# Patient Record
Sex: FEMALE | Race: BLACK OR AFRICAN AMERICAN | NOT HISPANIC OR LATINO
[De-identification: names, ages, dates, MRNs, and addresses within clinical notes are randomized per-mention and may not be internally consistent; named-entity substitution may affect disease eponyms.]

---

## 2022-12-16 ENCOUNTER — DASHBOARD ENCOUNTERS (OUTPATIENT)
Age: 57
End: 2022-12-16

## 2022-12-16 ENCOUNTER — OFFICE VISIT (OUTPATIENT)
Dept: URBAN - METROPOLITAN AREA CLINIC 115 | Facility: CLINIC | Age: 57
End: 2022-12-16
Payer: COMMERCIAL

## 2022-12-16 ENCOUNTER — LAB OUTSIDE AN ENCOUNTER (OUTPATIENT)
Dept: URBAN - METROPOLITAN AREA CLINIC 115 | Facility: CLINIC | Age: 57
End: 2022-12-16

## 2022-12-16 ENCOUNTER — WEB ENCOUNTER (OUTPATIENT)
Dept: URBAN - METROPOLITAN AREA CLINIC 115 | Facility: CLINIC | Age: 57
End: 2022-12-16

## 2022-12-16 VITALS
DIASTOLIC BLOOD PRESSURE: 75 MMHG | HEART RATE: 95 BPM | TEMPERATURE: 97.7 F | HEIGHT: 64 IN | BODY MASS INDEX: 22.53 KG/M2 | SYSTOLIC BLOOD PRESSURE: 116 MMHG | WEIGHT: 132 LBS

## 2022-12-16 DIAGNOSIS — Z86.010 PERSONAL HISTORY OF COLONIC POLYPS: ICD-10-CM

## 2022-12-16 PROCEDURE — 99202 OFFICE O/P NEW SF 15 MIN: CPT | Performed by: INTERNAL MEDICINE

## 2022-12-16 RX ORDER — ONDANSETRON 8 MG/1
TABLET ORAL
Qty: 0 | Refills: 0 | Status: ON HOLD | COMMUNITY
Start: 2017-06-20

## 2022-12-16 RX ORDER — BISACODYL 5 MG
1 TABLET AS NEEDED, TAKE 2 TABS FOR 2 DAYS PRIOR TO COLONOSCOPY TABLET, DELAYED RELEASE (ENTERIC COATED) ORAL ONCE A DAY
Qty: 20 TABLET | Refills: 0 | OUTPATIENT
Start: 2022-12-16 | End: 2022-12-26

## 2022-12-16 RX ORDER — SUCRALFATE 1 G/1
TAKE 1 TABLET BY ORAL ROUTE 2 TIMES A DAY FOR 30 DAYS TABLET ORAL 2
Qty: 60 | Refills: 0 | Status: ON HOLD | COMMUNITY
Start: 2017-08-09

## 2022-12-16 RX ORDER — ESOMEPRAZOLE MAGNESIUM 40 MG
TAKE 1 CAPSULE (40 MG) BY ORAL ROUTE ONCE DAILY FOR 30 DAYS CAPSULE,DELAYED RELEASE (ENTERIC COATED) ORAL 1
Qty: 30 | Refills: 1 | Status: ON HOLD | COMMUNITY
Start: 2017-08-09

## 2022-12-16 RX ORDER — SITAGLIPTIN AND METFORMIN HYDROCHLORIDE 50; 1000 MG/1; MG/1
TABLET, FILM COATED, EXTENDED RELEASE ORAL
Qty: 0 | Refills: 0 | Status: ACTIVE | COMMUNITY
Start: 2016-12-24

## 2022-12-16 RX ORDER — ALBUTEROL SULFATE 2.5 MG/3ML
SOLUTION RESPIRATORY (INHALATION)
Qty: 0 | Refills: 0 | Status: ON HOLD | COMMUNITY
Start: 2016-04-15

## 2022-12-16 RX ORDER — POLYETHYLENE GLYCOL 3350 17 G/DOSE
AS DIRECTED PRIOR TO COLONOSCOPY POWDER (GRAM) ORAL ONCE A DAY
Qty: 238 GRAM | Refills: 0 | OUTPATIENT
Start: 2022-12-16 | End: 2022-12-17

## 2022-12-16 NOTE — HPI-TODAY'S VISIT:
57-year-old female was seen today for colon cancer screening evaluation.  Patient denies any lower GI symptoms.  His last colonoscopy revealed colon polyps and was recommended to have repeat colonoscopy it was done outside the different practice.  Patient denies any bladder per rectum.  Denies any blood in the stools.  Denies any family history of colon cancer.

## 2023-01-11 PROBLEM — 428283002: Status: ACTIVE | Noted: 2022-12-16

## 2023-02-02 ENCOUNTER — OFFICE VISIT (OUTPATIENT)
Dept: URBAN - METROPOLITAN AREA SURGERY CENTER 13 | Facility: SURGERY CENTER | Age: 58
End: 2023-02-02
Payer: COMMERCIAL

## 2023-02-02 DIAGNOSIS — D12.3 ADENOMA OF TRANSVERSE COLON: ICD-10-CM

## 2023-02-02 DIAGNOSIS — Z86.010 ADENOMAS PERSONAL HISTORY OF COLONIC POLYPS: ICD-10-CM

## 2023-02-02 DIAGNOSIS — D12.5 ADENOMA OF SIGMOID COLON: ICD-10-CM

## 2023-02-02 PROCEDURE — G8907 PT DOC NO EVENTS ON DISCHARG: HCPCS | Performed by: INTERNAL MEDICINE

## 2023-02-02 PROCEDURE — 45385 COLONOSCOPY W/LESION REMOVAL: CPT | Performed by: INTERNAL MEDICINE

## 2023-02-02 PROCEDURE — 45380 COLONOSCOPY AND BIOPSY: CPT | Performed by: INTERNAL MEDICINE

## 2023-09-23 ENCOUNTER — HOSPITAL ENCOUNTER (OUTPATIENT)
Facility: CLINIC | Age: 58
Setting detail: OBSERVATION
Discharge: HOME | End: 2023-09-24
Attending: EMERGENCY MEDICINE | Admitting: HOSPITALIST
Payer: COMMERCIAL

## 2023-09-23 DIAGNOSIS — R27.0 ATAXIA: ICD-10-CM

## 2023-09-23 LAB
ALBUMIN SERPL BCG-MCNC: 4.9 G/DL (ref 3.5–5.2)
ALP SERPL-CCNC: 89 U/L (ref 35–104)
ALT SERPL W P-5'-P-CCNC: 33 U/L (ref 0–50)
ANION GAP SERPL CALCULATED.3IONS-SCNC: 14 MMOL/L (ref 7–15)
AST SERPL W P-5'-P-CCNC: 27 U/L (ref 0–45)
BASOPHILS # BLD AUTO: 0 10E3/UL (ref 0–0.2)
BASOPHILS NFR BLD AUTO: 0 %
BILIRUB SERPL-MCNC: 0.8 MG/DL
BUN SERPL-MCNC: 11.9 MG/DL (ref 6–20)
CALCIUM SERPL-MCNC: 9.9 MG/DL (ref 8.6–10)
CHLORIDE SERPL-SCNC: 96 MMOL/L (ref 98–107)
CREAT SERPL-MCNC: 0.62 MG/DL (ref 0.51–0.95)
DEPRECATED HCO3 PLAS-SCNC: 28 MMOL/L (ref 22–29)
EGFRCR SERPLBLD CKD-EPI 2021: >90 ML/MIN/1.73M2
EOSINOPHIL # BLD AUTO: 0 10E3/UL (ref 0–0.7)
EOSINOPHIL NFR BLD AUTO: 0 %
ERYTHROCYTE [DISTWIDTH] IN BLOOD BY AUTOMATED COUNT: 12.9 % (ref 10–15)
GLUCOSE SERPL-MCNC: 218 MG/DL (ref 70–99)
HCT VFR BLD AUTO: 40.7 % (ref 35–47)
HGB BLD-MCNC: 13 G/DL (ref 11.7–15.7)
IMM GRANULOCYTES # BLD: 0 10E3/UL
IMM GRANULOCYTES NFR BLD: 0 %
LYMPHOCYTES # BLD AUTO: 0.9 10E3/UL (ref 0.8–5.3)
LYMPHOCYTES NFR BLD AUTO: 13 %
MCH RBC QN AUTO: 27.3 PG (ref 26.5–33)
MCHC RBC AUTO-ENTMCNC: 31.9 G/DL (ref 31.5–36.5)
MCV RBC AUTO: 85 FL (ref 78–100)
MONOCYTES # BLD AUTO: 0.5 10E3/UL (ref 0–1.3)
MONOCYTES NFR BLD AUTO: 7 %
NEUTROPHILS # BLD AUTO: 5.9 10E3/UL (ref 1.6–8.3)
NEUTROPHILS NFR BLD AUTO: 80 %
NRBC # BLD AUTO: 0 10E3/UL
NRBC BLD AUTO-RTO: 0 /100
PLATELET # BLD AUTO: 218 10E3/UL (ref 150–450)
POTASSIUM SERPL-SCNC: 3.9 MMOL/L (ref 3.4–5.3)
PROT SERPL-MCNC: 8.4 G/DL (ref 6.4–8.3)
RBC # BLD AUTO: 4.77 10E6/UL (ref 3.8–5.2)
SODIUM SERPL-SCNC: 138 MMOL/L (ref 136–145)
WBC # BLD AUTO: 7.4 10E3/UL (ref 4–11)

## 2023-09-23 PROCEDURE — 80053 COMPREHEN METABOLIC PANEL: CPT | Performed by: EMERGENCY MEDICINE

## 2023-09-23 PROCEDURE — 85025 COMPLETE CBC W/AUTO DIFF WBC: CPT | Performed by: EMERGENCY MEDICINE

## 2023-09-23 PROCEDURE — 36415 COLL VENOUS BLD VENIPUNCTURE: CPT | Performed by: EMERGENCY MEDICINE

## 2023-09-23 PROCEDURE — 99285 EMERGENCY DEPT VISIT HI MDM: CPT | Mod: 25

## 2023-09-23 RX ORDER — SITAGLIPTIN AND METFORMIN HYDROCHLORIDE 1000; 50 MG/1; MG/1
1 TABLET, FILM COATED ORAL DAILY
COMMUNITY

## 2023-09-23 ASSESSMENT — ACTIVITIES OF DAILY LIVING (ADL): ADLS_ACUITY_SCORE: 35

## 2023-09-23 NOTE — LETTER
[facility]  [address] [address]  [phone]          September 24, 2023    RE:  Kavya Drake                                                                                                                                                       [address] [place] [address]          To whom it may concern:    Kavya Drake is under my professional care for Ataxia and she can fight with no restrictions     When the patient returns to work, the following restrictions apply until she sees her primary care team :  Sincerely,      Raimundo rizzo MD

## 2023-09-23 NOTE — LETTER
[facility]  [address] [facility] [address]  [phone]          September 24, 2023    RE:  Kavya Drake                                                                                                                                                       [address] [place] [address]            To whom it may concern:    Kavya Drake is under my professional care for Ataxia She  can flight with no restrictions    When the patient returns to work, the following restrictions apply until she sees her primary care provider for further evaluation:      Sincerely,      Navarro Gregory MD  [facility],  [place], [place]

## 2023-09-24 ENCOUNTER — APPOINTMENT (OUTPATIENT)
Dept: MRI IMAGING | Facility: CLINIC | Age: 58
End: 2023-09-24
Attending: EMERGENCY MEDICINE
Payer: COMMERCIAL

## 2023-09-24 ENCOUNTER — APPOINTMENT (OUTPATIENT)
Dept: CT IMAGING | Facility: CLINIC | Age: 58
End: 2023-09-24
Attending: HOSPITALIST
Payer: COMMERCIAL

## 2023-09-24 ENCOUNTER — APPOINTMENT (OUTPATIENT)
Dept: PHYSICAL THERAPY | Facility: CLINIC | Age: 58
End: 2023-09-24
Attending: HOSPITALIST
Payer: COMMERCIAL

## 2023-09-24 VITALS
WEIGHT: 116.84 LBS | RESPIRATION RATE: 16 BRPM | DIASTOLIC BLOOD PRESSURE: 72 MMHG | BODY MASS INDEX: 19.95 KG/M2 | HEART RATE: 92 BPM | SYSTOLIC BLOOD PRESSURE: 115 MMHG | HEIGHT: 64 IN | TEMPERATURE: 97.9 F | OXYGEN SATURATION: 95 %

## 2023-09-24 PROBLEM — R27.0 ATAXIA: Status: ACTIVE | Noted: 2023-09-24

## 2023-09-24 LAB
GLUCOSE BLDC GLUCOMTR-MCNC: 181 MG/DL (ref 70–99)
GLUCOSE BLDC GLUCOMTR-MCNC: 182 MG/DL (ref 70–99)

## 2023-09-24 PROCEDURE — 97161 PT EVAL LOW COMPLEX 20 MIN: CPT | Mod: GP | Performed by: PHYSICAL THERAPIST

## 2023-09-24 PROCEDURE — 250N000011 HC RX IP 250 OP 636: Performed by: EMERGENCY MEDICINE

## 2023-09-24 PROCEDURE — 70551 MRI BRAIN STEM W/O DYE: CPT

## 2023-09-24 PROCEDURE — A9585 GADOBUTROL INJECTION: HCPCS | Performed by: EMERGENCY MEDICINE

## 2023-09-24 PROCEDURE — G0378 HOSPITAL OBSERVATION PER HR: HCPCS

## 2023-09-24 PROCEDURE — 70547 MR ANGIOGRAPHY NECK W/O DYE: CPT

## 2023-09-24 PROCEDURE — 250N000013 HC RX MED GY IP 250 OP 250 PS 637: Performed by: EMERGENCY MEDICINE

## 2023-09-24 PROCEDURE — 82962 GLUCOSE BLOOD TEST: CPT

## 2023-09-24 PROCEDURE — 99223 1ST HOSP IP/OBS HIGH 75: CPT | Performed by: HOSPITALIST

## 2023-09-24 PROCEDURE — 70544 MR ANGIOGRAPHY HEAD W/O DYE: CPT

## 2023-09-24 PROCEDURE — 255N000002 HC RX 255 OP 636: Performed by: EMERGENCY MEDICINE

## 2023-09-24 PROCEDURE — 74177 CT ABD & PELVIS W/CONTRAST: CPT

## 2023-09-24 PROCEDURE — 99418 PROLNG IP/OBS E/M EA 15 MIN: CPT | Performed by: HOSPITALIST

## 2023-09-24 PROCEDURE — 250N000009 HC RX 250: Performed by: EMERGENCY MEDICINE

## 2023-09-24 RX ORDER — ONDANSETRON 2 MG/ML
4 INJECTION INTRAMUSCULAR; INTRAVENOUS EVERY 6 HOURS PRN
Status: DISCONTINUED | OUTPATIENT
Start: 2023-09-24 | End: 2023-09-24 | Stop reason: HOSPADM

## 2023-09-24 RX ORDER — AMOXICILLIN 250 MG
2 CAPSULE ORAL 2 TIMES DAILY PRN
Status: DISCONTINUED | OUTPATIENT
Start: 2023-09-24 | End: 2023-09-24 | Stop reason: HOSPADM

## 2023-09-24 RX ORDER — GADOBUTROL 604.72 MG/ML
10 INJECTION INTRAVENOUS ONCE
Status: COMPLETED | OUTPATIENT
Start: 2023-09-24 | End: 2023-09-24

## 2023-09-24 RX ORDER — ONDANSETRON 4 MG/1
4 TABLET, ORALLY DISINTEGRATING ORAL EVERY 6 HOURS PRN
Status: DISCONTINUED | OUTPATIENT
Start: 2023-09-24 | End: 2023-09-24 | Stop reason: HOSPADM

## 2023-09-24 RX ORDER — ACETAMINOPHEN 650 MG/1
650 SUPPOSITORY RECTAL EVERY 6 HOURS PRN
Status: DISCONTINUED | OUTPATIENT
Start: 2023-09-24 | End: 2023-09-24 | Stop reason: HOSPADM

## 2023-09-24 RX ORDER — LANOLIN ALCOHOL/MO/W.PET/CERES
3 CREAM (GRAM) TOPICAL
Status: DISCONTINUED | OUTPATIENT
Start: 2023-09-24 | End: 2023-09-24 | Stop reason: HOSPADM

## 2023-09-24 RX ORDER — ACETAMINOPHEN 325 MG/1
650 TABLET ORAL EVERY 6 HOURS PRN
Status: DISCONTINUED | OUTPATIENT
Start: 2023-09-24 | End: 2023-09-24 | Stop reason: HOSPADM

## 2023-09-24 RX ORDER — LIDOCAINE 40 MG/G
CREAM TOPICAL
Status: DISCONTINUED | OUTPATIENT
Start: 2023-09-24 | End: 2023-09-24 | Stop reason: HOSPADM

## 2023-09-24 RX ORDER — MECLIZINE HYDROCHLORIDE 25 MG/1
25 TABLET ORAL 3 TIMES DAILY PRN
Status: DISCONTINUED | OUTPATIENT
Start: 2023-09-24 | End: 2023-09-24 | Stop reason: HOSPADM

## 2023-09-24 RX ORDER — NICOTINE POLACRILEX 4 MG
15-30 LOZENGE BUCCAL
Status: DISCONTINUED | OUTPATIENT
Start: 2023-09-24 | End: 2023-09-24 | Stop reason: HOSPADM

## 2023-09-24 RX ORDER — MECLIZINE HYDROCHLORIDE 25 MG/1
25 TABLET ORAL ONCE
Status: COMPLETED | OUTPATIENT
Start: 2023-09-24 | End: 2023-09-24

## 2023-09-24 RX ORDER — AMOXICILLIN 250 MG
1 CAPSULE ORAL 2 TIMES DAILY PRN
Status: DISCONTINUED | OUTPATIENT
Start: 2023-09-24 | End: 2023-09-24 | Stop reason: HOSPADM

## 2023-09-24 RX ORDER — MULTIPLE VITAMINS W/ MINERALS TAB 9MG-400MCG
1 TAB ORAL DAILY
COMMUNITY

## 2023-09-24 RX ORDER — DEXTROSE MONOHYDRATE 25 G/50ML
25-50 INJECTION, SOLUTION INTRAVENOUS
Status: DISCONTINUED | OUTPATIENT
Start: 2023-09-24 | End: 2023-09-24 | Stop reason: HOSPADM

## 2023-09-24 RX ORDER — MECLIZINE HYDROCHLORIDE 25 MG/1
25 TABLET ORAL 3 TIMES DAILY PRN
Qty: 30 TABLET | Refills: 0 | Status: SHIPPED | OUTPATIENT
Start: 2023-09-24

## 2023-09-24 RX ORDER — IOPAMIDOL 755 MG/ML
60 INJECTION, SOLUTION INTRAVASCULAR ONCE
Status: COMPLETED | OUTPATIENT
Start: 2023-09-24 | End: 2023-09-24

## 2023-09-24 RX ORDER — ONDANSETRON 4 MG/1
4 TABLET, ORALLY DISINTEGRATING ORAL EVERY 6 HOURS PRN
Qty: 30 TABLET | Refills: 0 | Status: SHIPPED | OUTPATIENT
Start: 2023-09-24

## 2023-09-24 RX ADMIN — MECLIZINE HYDROCHLORIDE 25 MG: 25 TABLET ORAL at 03:35

## 2023-09-24 RX ADMIN — IOPAMIDOL 60 ML: 755 INJECTION, SOLUTION INTRAVENOUS at 06:10

## 2023-09-24 RX ADMIN — SODIUM CHLORIDE 60 ML: 9 INJECTION, SOLUTION INTRAVENOUS at 06:11

## 2023-09-24 ASSESSMENT — ACTIVITIES OF DAILY LIVING (ADL)
ADLS_ACUITY_SCORE: 37
ADLS_ACUITY_SCORE: 37
ADLS_ACUITY_SCORE: 35
ADLS_ACUITY_SCORE: 35
ADLS_ACUITY_SCORE: 37
ADLS_ACUITY_SCORE: 35
ADLS_ACUITY_SCORE: 37

## 2023-09-24 NOTE — H&P
[facility] [place]    History and Physical  Hospitalist       Date of Admission:  9/23/2023  Date of Service (when I saw the patient): 09/24/23    ASSESSMENT  Kavya Drake is a markedly pleasant 58 year old woman with past medical history that is most notable for Type 2 Diabetes mellitus, who presents with acute dizziness and ataxia.    PLAN     Acute dizziness and ataxia: Of note, Ms. Drake is a  visiting from [place] for work. She describes episodes of intermittent dizziness with bilateral pulsatile tinnitus. Notes from Care Everywhere show that she saw an ENT specialist in [place] in 2020, and that MRI, MRA, CT Neck, CT Sinuses, audiogram, and tympanogram testing were done; I can not access the results. She tells me she was offered balloon drainage for sinusitis but declined that.    She presents now for severe acute dizziness, with nausea, vomiting, and severe ataxia, to the point that she can not walk unassisted in the ED; whe is afebrile. She has tachycardia without hypotension or hypoxia. CBC and CMP show slight elevation in total protein. WBC is normal. MRI/MRA of head and neck show Mild nonspecific supratentorial white matter disease, without other acute intracranial pathology; also noting that the paranasal sinuses, middle ear, and mastoids currently show no inflammation or effusions. Overall, the cause of Ms. Drake's acute dizziness and ataxia is unclear. There are no clear signs of an acute infection, nor stroke. BPPV, MS, Meniere's Disease, perhaps other rare inner ear pathology could be on the differential.     She tells me this morning she would like to get better to the point that she can walk safely so that she can fly back to [place] later today or tomorrow to see her primary team. She says that Meclizine has helped in the ED so far.      -- Observation. ADAT. Trial of Meclizine continued. Neurology and Vestibular PT ordered for further evaluation.  Tylenol and anti-emetics ordered as needed.    Chronic abdominal pain: Cause unclear. CT abdomen and pelvis ordered for further evaluation.    Type 2 Diabetes mellitus: On Jardiance as an outpatient.    -- ISS insulin while hospitalized. Resume home medication(s) at discharge     I have spent 80 minutes on the date of service doing chart review, history, examination, documentation, and further activities per the note.    Chief Complaint   Dizziness    History is obtained from the patient and the ED physician whom I have spoken with    History of Present Illness   Kavya Drake is a markedly pleasant 58 year old woman who presents with dizziness. She says that she has has intermittent spells of severe dizziness for years. She is a  based in [place], and a couple of years ago she was evaluated for these spells, which are associated with pulsatile tinnitus of both ears, as well as a sensation of swelling around the right neck, and a sensation difficult for her to characterize in the back of her neck, similar to a feeling of shocks. She says she was found to have blocked sinuses at that time, and was recommended to have balloon drainage but declined. Since then the spells have persisted. She will often have difficulty walking and nausea with them as well. Since the beginning of this year, she has noted left sided abdominal pain that is constant, and has had US testing that showed fibroids on the right side of her uterus rather than the left. Last week she saw her PCP for the pain and she is scheduled to go back and see her team again soon. She has noted loss of weight over the past several months, from 130 to 118 pounds, since starting Jardiance; she cut back on the Jardiance after the weight loss started, but the weight loss has persisted despite that. She has noted ongoing intermittent cough, post-nasal drip, and sore throat. On 9/19/23, she flew in here for work from [place]; on the flight  "she felt her right ear become painful and then \"pop\" and that resolved the pain, which has not recurred. She was not feeling dizzy at that time, or immediately after that, until yesterday, when she woke up with acute onset severe dizziness; this has been constant since onset when she tries to stand; it is improved with rest. She went to work and \"pushed through\" but while there started to vomit and so she came in for further evaluation. In the ED, she is unable to walk without assistance. She says when she tries to walk she seems to always veer to the left and has to catch herself. She has not fallen down. Review of systems is also positive for chronic urge incontinence of the bladder. In the ED, she has received Meclizine, and that has improved her symptoms. She otherwise denies vertigo, or any vision changes, or loss of hearing, or weakness in hands or feet, or any other acute complaints.    In the ED,   09/23 2107 133/75 97.8  F (36.6  C) 98 18 100 %     CBC and CMP were notable for Cl 96, Tprot 8.4, Glucose 218, otherwise were within the normal reference range. Alb was 4.9. WBC 7.4.    Recent Results (from the past 24 hour(s))   MR Brain w/o Contrast    Narrative    EXAM: MR BRAIN W/O CONTRAST, MRA BRAIN ([place]) W/O CONTRAST, MRA NECK (CAROTIDS) W/O CONTRAST  LOCATION: [facility]  DATE: 9/24/2023    INDICATION: ataxia  COMPARISON: None.  TECHNIQUE:   1) Routine multiplanar multisequence head MRI without intravenous contrast.  2) 3D time-of-flight head MRA without intravenous contrast.  3) Neck MRA without IV contrast. Stenosis measurements made according to NASCET criteria unless otherwise specified.      FINDINGS: Exam is somewhat limited by metallic susceptibility artifact scalp.    HEAD MRI:  INTRACRANIAL CONTENTS: No acute or subacute infarct. No mass, acute hemorrhage, or extra-axial fluid collections. Scattered nonspecific foci of T2/FLAIR hyperintense signal in the " cerebral white matter. Normal ventricles and sulci. Normal position of the   cerebellar tonsils.     SELLA: No abnormality accounting for technique.    OSSEOUS STRUCTURES/SOFT TISSUES: Normal marrow signal. The major intracranial vascular flow voids are maintained.     ORBITS: No abnormality accounting for technique.     SINUSES/MASTOIDS: No paranasal sinus mucosal disease. No middle ear or mastoid effusion.     HEAD MRA:   ANTERIOR CIRCULATION: No stenosis/occlusion, aneurysm, or high flow vascular malformation. Standard [place] anatomy.    POSTERIOR CIRCULATION: No stenosis/occlusion, aneurysm, or high flow vascular malformation. Dominant left and smaller right vertebral artery contribute to a normal basilar artery. 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     NECK MRA:   RIGHT CAROTID: No measurable stenosis or dissection.    LEFT CAROTID: No measurable stenosis or dissection.    VERTEBRAL ARTERIES: No focal stenosis or dissection. Dominant left and smaller right vertebral arteries.    AORTIC ARCH: Suboptimally visualized on this exam.      Impression    IMPRESSION:  HEAD MRI:   1.  Technically limited exam. No evidence of an acute intracranial process.  2.  Mild nonspecific supratentorial white matter disease. Differential diagnosis includes chronic microvascular ischemic disease, demyelination, prior trauma, sequelae of chronic headaches, or other remote insult.    HEAD MRA:   1.  No proximal arterial occlusion or flow-limiting stenosis.    NECK MRA:  1.  No flow-limiting stenosis of the cervical arterial vasculature.   MRA Neck ([place]) wo Contrast    Narrative    EXAM: MR BRAIN W/O CONTRAST, MRA BRAIN ([place]) W/O CONTRAST, MRA NECK (CAROTIDS) W/O CONTRAST  LOCATION: [facility]  DATE: 9/24/2023    INDICATION: ataxia  COMPARISON: None.  TECHNIQUE:   1) Routine multiplanar multisequence head MRI without intravenous contrast.  2) 3D time-of-flight head MRA  without intravenous contrast.  3) Neck MRA without IV contrast. Stenosis measurements made according to NASCET criteria unless otherwise specified.      FINDINGS: Exam is somewhat limited by metallic susceptibility artifact scalp.    HEAD MRI:  INTRACRANIAL CONTENTS: No acute or subacute infarct. No mass, acute hemorrhage, or extra-axial fluid collections. Scattered nonspecific foci of T2/FLAIR hyperintense signal in the cerebral white matter. Normal ventricles and sulci. Normal position of the   cerebellar tonsils.     SELLA: No abnormality accounting for technique.    OSSEOUS STRUCTURES/SOFT TISSUES: Normal marrow signal. The major intracranial vascular flow voids are maintained.     ORBITS: No abnormality accounting for technique.     SINUSES/MASTOIDS: No paranasal sinus mucosal disease. No middle ear or mastoid effusion.     HEAD MRA:   ANTERIOR CIRCULATION: No stenosis/occlusion, aneurysm, or high flow vascular malformation. Standard [place] anatomy.    POSTERIOR CIRCULATION: No stenosis/occlusion, aneurysm, or high flow vascular malformation. Dominant left and smaller right vertebral artery contribute to a normal basilar artery. 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     NECK MRA:   RIGHT CAROTID: No measurable stenosis or dissection.    LEFT CAROTID: No measurable stenosis or dissection.    VERTEBRAL ARTERIES: No focal stenosis or dissection. Dominant left and smaller right vertebral arteries.    AORTIC ARCH: Suboptimally visualized on this exam.      Impression    IMPRESSION:  HEAD MRI:   1.  Technically limited exam. No evidence of an acute intracranial process.  2.  Mild nonspecific supratentorial white matter disease. Differential diagnosis includes chronic microvascular ischemic disease, demyelination, prior trauma, sequelae of chronic headaches, or other remote insult.    HEAD MRA:   1.  No proximal arterial occlusion or flow-limiting stenosis.    NECK MRA:  1.  No flow-limiting stenosis  of the cervical arterial vasculature.   MRA Brain ([place]) wo Contrast    Narrative    EXAM: MR BRAIN W/O CONTRAST, MRA BRAIN ([place]) W/O CONTRAST, MRA NECK (CAROTIDS) W/O CONTRAST  LOCATION: [facility]  DATE: 9/24/2023    INDICATION: ataxia  COMPARISON: None.  TECHNIQUE:   1) Routine multiplanar multisequence head MRI without intravenous contrast.  2) 3D time-of-flight head MRA without intravenous contrast.  3) Neck MRA without IV contrast. Stenosis measurements made according to NASCET criteria unless otherwise specified.      FINDINGS: Exam is somewhat limited by metallic susceptibility artifact scalp.    HEAD MRI:  INTRACRANIAL CONTENTS: No acute or subacute infarct. No mass, acute hemorrhage, or extra-axial fluid collections. Scattered nonspecific foci of T2/FLAIR hyperintense signal in the cerebral white matter. Normal ventricles and sulci. Normal position of the   cerebellar tonsils.     SELLA: No abnormality accounting for technique.    OSSEOUS STRUCTURES/SOFT TISSUES: Normal marrow signal. The major intracranial vascular flow voids are maintained.     ORBITS: No abnormality accounting for technique.     SINUSES/MASTOIDS: No paranasal sinus mucosal disease. No middle ear or mastoid effusion.     HEAD MRA:   ANTERIOR CIRCULATION: No stenosis/occlusion, aneurysm, or high flow vascular malformation. Standard [place] anatomy.    POSTERIOR CIRCULATION: No stenosis/occlusion, aneurysm, or high flow vascular malformation. Dominant left and smaller right vertebral artery contribute to a normal basilar artery. 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     NECK MRA:   RIGHT CAROTID: No measurable stenosis or dissection.    LEFT CAROTID: No measurable stenosis or dissection.    VERTEBRAL ARTERIES: No focal stenosis or dissection. Dominant left and smaller right vertebral arteries.    AORTIC ARCH: Suboptimally visualized on this exam.      Impression     "IMPRESSION:  HEAD MRI:   1.  Technically limited exam. No evidence of an acute intracranial process.  2.  Mild nonspecific supratentorial white matter disease. Differential diagnosis includes chronic microvascular ischemic disease, demyelination, prior trauma, sequelae of chronic headaches, or other remote insult.    HEAD MRA:   1.  No proximal arterial occlusion or flow-limiting stenosis.    NECK MRA:  1.  No flow-limiting stenosis of the cervical arterial vasculature.       PHYSICAL EXAM  Blood pressure 136/84, pulse 78, temperature 98.8  F (37.1  C), temperature source Oral, resp. rate 16, height 1.626 m (5' 4\"), weight 53.5 kg (118 lb), SpO2 100 %.  Constitutional: Alert and oriented to person, place and time; no apparent distress  HEENT: normocephalic moist mucus membranes  Respiratory: lungs clear to auscultation bilaterally  Cardiovascular: regular S1 S2  GI: abdomen soft non tender non distended bowel sounds positive  Musculoskeletal: no clubbing, cyanosis or edema  Neurologic: extra-ocular muscles intact; moves all four extremities  Psychiatric: appropriate affect, insight and judgment     DVT Prophylaxis: Pneumatic Compression Devices  Code Status: Full Code    Disposition: Expected discharge in 0-3 days    Edmond Li MD, MD    Past Medical History    I have reviewed this patient's medical history and updated it with pertinent information if needed.   Past Medical History:   Diagnosis Date    GERD (gastroesophageal reflux disease)     Palpitations     Type 2 diabetes mellitus (H)        Past Surgical History   I have reviewed this patient's surgical history and updated it with pertinent information if needed.  Past Surgical History:   Procedure Laterality Date     SECTION      CHOLECYSTECTOMY      COLONOSCOPY      ENDOMETRIAL ABLATION         Prior to Admission Medications   Prior to Admission Medications   Prescriptions Last Dose Informant Patient Reported? Taking?   Empagliflozin " (JARDIANCE PO)   Yes No   Sig: Jardiance Take No date recorded No form recorded No frequency recorded No route recorded No set duration recorded No set duration amount recorded active No dosage strength recorded No dosage strength units of measure recorded   sitagliptin-metFORMIN (JANUMET)  MG tablet   Yes No      Facility-Administered Medications: None     Allergies   Allergies   Allergen Reactions    Guaifenesin Swelling    Pantoprazole Anaphylaxis and Unknown    Amoxicillin Rash     Other reaction(s): Unknown       Social History   I have reviewed this patient's social history and updated it with pertinent information if needed. Kavya Drake  reports that she has never smoked. She does not have any smokeless tobacco history on file.    Family History   Family history assessed and, except as above, is non-contributory.    No family history on file.    Review of Systems   The 10 point Review of Systems is negative other than noted in the HPI or here.     Primary Care Physician   Provider Not In System    Data   Labs Ordered and Resulted from Time of ED Arrival to Time of ED Departure   COMPREHENSIVE METABOLIC PANEL - Abnormal       Result Value    Sodium 138      Potassium 3.9      Chloride 96 (*)     Carbon Dioxide (CO2) 28      Anion Gap 14      Urea Nitrogen 11.9      Creatinine 0.62      Calcium 9.9      Glucose 218 (*)     Alkaline Phosphatase 89      AST 27      ALT 33      Protein Total 8.4 (*)     Albumin 4.9      Bilirubin Total 0.8      GFR Estimate >90     CBC WITH PLATELETS AND DIFFERENTIAL    WBC Count 7.4      RBC Count 4.77      Hemoglobin 13.0      Hematocrit 40.7      MCV 85      MCH 27.3      MCHC 31.9      RDW 12.9      Platelet Count 218      % Neutrophils 80      % Lymphocytes 13      % Monocytes 7      % Eosinophils 0      % Basophils 0      % Immature Granulocytes 0      NRBCs per 100 WBC 0      Absolute Neutrophils 5.9      Absolute Lymphocytes 0.9      Absolute Monocytes  0.5      Absolute Eosinophils 0.0      Absolute Basophils 0.0      Absolute Immature Granulocytes 0.0      Absolute NRBCs 0.0         Data reviewed today:  I personally reviewed the brain MRI image(s) showing no acute pathology .

## 2023-09-24 NOTE — PHARMACY-ADMISSION MEDICATION HISTORY
Pharmacy Intern Admission Medication History    Admission medication history is complete. The information provided in this note is only as accurate as the sources available at the time of the update.    Medication reconciliation/reorder completed by provider prior to medication history? No    Information Source(s): Patient and CareEverywhere/SureScripts via phone    Pertinent Information: Kavya only takes her Jardiance and Sitagliptin-Metformin if her blood sugar is > 200 mg/dL. She is traveling and says she has not been taking her medications daily during the last week.    Changes made to PTA medication list:  Added:   Vitamin D  Multivitamin  Zinc  Magnesium   Deleted: None  Changed:   Added directions to Jardiance and Janument    Medication Affordability:  Not including over the counter (OTC) medications, was there a time in the past 3 months when you did not take your medications as prescribed because of cost?: No    Allergies reviewed with patient and updates made in EHR: yes    Medication History Completed By: Raeann Gross 9/24/2023 9:28 AM    Prior to Admission medications    Medication Sig Last Dose Taking? Auth Provider Long Term End Date   empagliflozin (JARDIANCE) 10 MG TABS tablet Take 10 mg by mouth daily If blood glucose is > 200 mg/dL Past Week Yes Reported, Patient     MAGNESIUM PO Take 1 tablet by mouth daily Strength unknown Past Week Yes Unknown, Entered By History     Multiple Vitamins-Minerals (ZINC PO) Take 1 tablet by mouth daily Strength unknown Past Week Yes Unknown, Entered By History     multivitamin w/minerals (THERA-VIT-M) tablet Take 1 tablet by mouth daily Past Week Yes Unknown, Entered By History     sitagliptin-metFORMIN (JANUMET)  MG tablet Take 1 tablet by mouth daily If blood glucose > 200 mg/dL Past Week Yes Reported, Patient Yes    VITAMIN D PO Take 1 tablet by mouth daily Strength unknown Past Week Yes Unknown, Entered By History

## 2023-09-24 NOTE — PLAN OF CARE
Goal Outcome Evaluation:    Patient is A&O x4. Up independent in room, voiding. Denies chest pain or SOB. Tenderness present on LLQ. No nausea or dizziness. IV access discontinued. Reviewed AVS with patient. All questions answered. Discharge home with medications and belonging.

## 2023-09-24 NOTE — ED NOTES
"[facility]  ED Nurse Handoff Report    ED Chief complaint: Dizziness      ED Diagnosis:   Final diagnoses:   Ataxia       Code Status: Full Code    Allergies:   Allergies   Allergen Reactions    Guaifenesin Swelling    Pantoprazole Anaphylaxis and Unknown    Amoxicillin Rash     Other reaction(s): Unknown       Patient Story:   Kavya Drake is a 58 year old female with history of type 2 diabetes who presents to the ED via EMS with her friend for evaluation of dizziness. She is a [employer] . She reports feeling like her right \"popped\" and it felt painful but she didn't think too much from it. This morning, she reports being off balance when going to the bathroom along with dizziness and nausea. As the day progressed, the dizziness and nausea worsened which resulted to her vomiting. She states when bending over, she feels like she will fall over. Nausea and vomiting comes when she feels like the room is spinning. She never had an episode of vertigo before. She reports having tingling and numbness on her right leg a week ago but it decreased after treating it. She states sitting with her legs crossed a lot. She feels off balance currently.     Focused Assessment:    A+Ox4;  SBP's 100's; HR~96 bpm;  >90% RA  Dizziness    Treatments and/or interventions provided:   25 MG Meclizine    Patient's response to treatments and/or interventions:   No change following Meclizine    To be done/followed up on inpatient unit:    Continue with plan of care    Does this patient have any cognitive concerns?:  None    Activity level - Baseline/Home:  Independent  Activity Level - Current:   Independent    Patient's Preferred language: English   Needed?: No    Isolation: None  Infection: Not Applicable  Patient tested for COVID 19 prior to admission: NO  Bariatric?: No    Vital Signs:   Vitals:    09/23/23 2107 09/23/23 2300 09/24/23 0114   BP: 133/75 106/71 136/84   Pulse: 98 89 78 " "  Resp: 18 16 16   Temp: 97.8  F (36.6  C) 98.8  F (37.1  C)    TempSrc:  Oral    SpO2: 100% 98% 100%   Weight: 53.5 kg (118 lb)     Height: 1.626 m (5' 4\")         Cardiac Rhythm:     Was the PSS-3 completed:   Yes  What interventions are required if any?               Family Comments: None at bedside.  OBS brochure/video discussed/provided to patient/family: Yes    For the majority of the shift this patient's behavior was Green.   Behavioral interventions performed were None.    ED NURSE PHONE NUMBER: *[phone]         "

## 2023-09-24 NOTE — PROGRESS NOTES
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   Self-Care   Usual Activity Tolerance excellent   Current Activity Tolerance excellent   Regular Exercise No   Equipment Currently Used at Home none   Fall history within last six months no   Activity/Exercise/Self-Care Comment normally independent, flight attendent with [employer]   General Information   Onset of Illness/Injury or Date of Surgery 09/23/23   Referring Physician Raimundo Gregory MD   Patient/Family Therapy Goals Statement (PT) return home   Pertinent History of Current Problem (include personal factors and/or comorbidities that impact the POC) Of note, Ms. Drake is a  visiting from [place] for work. She describes episodes of intermittent dizziness with bilateral pulsatile tinnitus. Notes from Care Everywhere show that she saw an ENT specialist in [place] in 2020, and that MRI, MRA, CT Neck, CT Sinuses, audiogram, and tympanogram testing were done; I can not access the results. She tells me she was offered balloon drainage for sinusitis but declined that.   Weight-Bearing Status - LUE full weight-bearing   Weight-Bearing Status - RUE full weight-bearing   Weight-Bearing Status - LLE full weight-bearing   Weight-Bearing Status - RLE full weight-bearing   Cognition   Affect/Mental Status (Cognition) WFL   Orientation Status (Cognition) oriented x 4   Bed Mobility   Comment, (Bed Mobility) independent   Transfers   Comment, (Transfers) independent   Gait/Stairs (Locomotion)   [place] Level (Gait) independent   Sensory Examination   Sensory Perception patient reports no sensory changes   Coordination   Coordination no deficits were  identified   Oculomotor Exam   Smooth Pursuit Normal   Saccades Normal   VOR Abnormal   VOR Cancellation Abnormal   Rapid Head Thrust Corrective Saccade R head thrust   Infrared Goggle Exam or Frenzel Lense Exam   Positional testing Negative   Racine-Hallpike (Right) Negative   Racine-Hallpike (Left) Negative   HSCC Supine Roll Test (Right) Negative   HSCC Supine Roll Test (Left) Negative   Clinical Impression   Criteria for Skilled Therapeutic Intervention Evaluation only   Clinical Impression Comments positive R beating nystagmus with R side gaze, impaired R VOR.   PT Total Evaluation Time   PT Eval, Low Complexity Minutes (79780) 60   Physical Therapy Goals   PT Frequency One time eval and treatment only   PT Predicted Duration/Target Date for Goal Attainment 09/24/23   PT Goals Bed Mobility;Transfers;Gait;Stairs   PT: Bed Mobility Independent;Goal Met   PT: Transfers Independent;Goal Met   PT: Gait Independent;Goal Met;Greater than 200 feet   PT: Stairs Independent;Goal Met;4 stairs   PT Discharge Planning   PT Plan met all IP PT goals   PT Discharge Recommendation (DC Rec) home   PT Rationale for DC Rec (S)  Pt moving independently and symptoms currently managed with meclizine, safe to return home upon medical clearance. Pt appears to have recurrent and ongoing congestion affecting her vestibular system but this does not appear to be as a result of vestibular pathology. Pt negative for BPPV, but does demonstrate some R sided nystagmus and VOR impairment indicating R sided vestibular deficits. Of note: pt has recurrent congestion, tinnitus, and swollen lymph node? on R side of neck - defer to ENT for continued work-up however pt also reports hx of R sided tooth ache that should be addressed to see if this improves symptoms.   PT Brief overview of current status independent   Total Session Time   Total Session Time (sum of timed and untimed services) 60
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
## 2023-09-24 NOTE — ED TRIAGE NOTES
Pt BIBA for R ear pain that started Tuesday. Pt is a  and on descent heard pop on Tuesday. Today felt worse on another flight. Having pain, nausea and dizziness. EMS gave 4 zofran. VSS.

## 2023-09-24 NOTE — INTERIM SUMMARY
"Is refer to the H&P by Dr. Edmond Dumont from earlier today for the details of this presentation, in brief, still presented with a dizziness of unclear etiology.  She had extensive work-up from an ENT doctor in her hometown of [place] recently.  Prior imaging studies including MRIs MRA here at [facility] was also grossly unremarkable, except for MRI of the brain showin\"Mild nonspecific supratentorial white matter disease. Differential diagnosis includes chronic microvascular ischemic disease, demyelination, prior trauma, sequelae of chronic headaches, or other remote insult. \"  -Plan includes:  -# Neurology consulted, input is pending at the time of this note.  -#Advance the diet to a regular diet  -#Vestibular physical therapy ordered  Disposition: Pending Neurology and therapy input,  likely discharge later this afternoon or tomorrow morning.    "

## 2023-09-24 NOTE — PROVIDER NOTIFICATION
MD Notification    Notified Person: MD    Notified Person Name: Dr. Gregory    Notification Date/Time: 09/24/2023 @ 0917    Notification Interaction: [email or web address]    Purpose of Notification: Patient refused insulin. has questions/ concerns with insulin intake. per patient, she  is an insulin resistant and produce a lot of insulin, wanted to talk to you. Thanks     Orders Received: @ 7602: MD acknowledged and will stop by patient room.     Comments:  @1110 paged hospitalist : PT just saw her, they will sign off. Wondering if you are planning to discharge her today, patient is willing to go home with meclizine. Discharge phamacy will close @ noon today. Thanks.

## 2023-09-24 NOTE — PLAN OF CARE
Goal Outcome Evaluation:    Pt arrived to the unit around 06:30. A&O x4. VSS on RA. Denies dizziness/pain/n/v. CT abdomen and pelvis done. Pt is oriented to the unit, settled, and all pt's needs are met at this time and appears to rest comfortably.

## 2023-09-24 NOTE — DISCHARGE SUMMARY
"[facility]  Hospitalist Discharge Summary      Date of Admission:  9/23/2023  Date of Discharge:  9/24/2023.  Discharging Provider: Raimundo Gregory MD  Discharge Service: Hospitalist Service    Discharge Diagnoses   Patient Active Problem List   Diagnosis    Ataxia         Clinically Significant Risk Factors          Follow-ups Needed After Discharge   Follow-up Appointments     Follow-up and recommended labs and tests       Follow up with primary care provider, Provider Not In System, within 7   days for hospital follow- up.  No follow up labs or test are needed.        {Additional follow-up instructions/to-do's for PCP for routine cares    Unresulted Labs Ordered in the Past 30 Days of this Admission       No orders found for last 31 day(s).        These results will be followed up by PCP    Discharge Disposition   Discharged to home  Condition at discharge: Stable    Hospital Course   Please refer to the H&P note for the details of this presentation, in brief, Ms. Kavya Drake is a pleasant 58 year old woman with past medical history that is most notable for Type 2 Diabetes mellitus, who presents with acute dizziness and ataxia   She had extensive work-up from an ENT doctor in her hometown of [place] recently.  Prior imaging studies including MRIs MRA. Here at [facility]  imaging studies were also grossly unremarkable, except for MRI of the brain showin\"Mild nonspecific supratentorial white matter disease. Differential diagnosis includes chronic microvascular ischemic disease, demyelination, prior trauma, sequelae of chronic headaches, or other remote insult. \"   Physical therapy Vestibular function evaluation revealed no issues, and she was cleared for discharge and flight back to home [facility] per patient's request . She requested to complete her work in her hometown where she had providers familiar with her previous workup    Consultations This " Hospital Stay   CARE MANAGEMENT / SOCIAL WORK IP CONSULT  NEUROLOGY IP CONSULT  VESTIBULAR PHYSICAL THERAPY IP CONSULT  PHYSICAL THERAPY ADULT IP CONSULT    Code Status   Full Code    Time Spent on this Encounter   I, Raimundo Gregory MD, personally saw the patient today and spent greater than 30 minutes discharging this patient.       Raimundo Gregory MD  [facility]  [address] [facility] [address]  Phone: [phone]  Fax: [phone]  ______________________________________________________________________    Physical Exam   Vital Signs: Temp: 97.9  F (36.6  C) Temp src: Oral BP: 115/72 Pulse: 92   Resp: 16 SpO2: 95 % O2 Device: None (Room air)    Weight: 116 lbs 13.5 oz  General Appearance: Alert in NAD  Respiratory: CTA no wheezing or crackles  Cardiovascular: RRR no murmurs or gallops  GI: soft, NT/ND + BS         Primary Care Physician   Provider Not In System    Discharge Orders      Reason for your hospital stay    dizziness     Follow-up and recommended labs and tests     Follow up with primary care provider, Provider Not In System, within 7 days for hospital follow- up.  No follow up labs or test are needed.     Activity    Your activity upon discharge: activity as tolerated     Diet    Follow this diet upon discharge: Orders Placed This Encounter      Regular Diet Adult       Significant Results and Procedures   Most Recent 3 CBC's:  Recent Labs   Lab Test 09/23/23 2112   WBC 7.4   HGB 13.0   MCV 85          Discharge Medications   Current Discharge Medication List        START taking these medications    Details   meclizine (ANTIVERT) 25 MG tablet Take 1 tablet (25 mg) by mouth 3 times daily as needed for dizziness  Qty: 30 tablet, Refills: 0    Associated Diagnoses: Ataxia      ondansetron (ZOFRAN ODT) 4 MG ODT tab Take 1 tablet (4 mg) by mouth every 6 hours as needed for nausea or vomiting  Qty: 30 tablet, Refills: 0    Associated  Diagnoses: Ataxia           CONTINUE these medications which have NOT CHANGED    Details   empagliflozin (JARDIANCE) 10 MG TABS tablet Take 10 mg by mouth daily If blood glucose is > 200 mg/dL      MAGNESIUM PO Take 1 tablet by mouth daily Strength unknown      Multiple Vitamins-Minerals (ZINC PO) Take 1 tablet by mouth daily Strength unknown      multivitamin w/minerals (THERA-VIT-M) tablet Take 1 tablet by mouth daily      sitagliptin-metFORMIN (JANUMET)  MG tablet Take 1 tablet by mouth daily If blood glucose > 200 mg/dL      VITAMIN D PO Take 1 tablet by mouth daily Strength unknown           Allergies   Allergies   Allergen Reactions    Guaifenesin Swelling    Pantoprazole Anaphylaxis and Unknown    Amoxicillin Rash     Other reaction(s): Unknown

## 2023-09-24 NOTE — PROGRESS NOTES
RECEIVING UNIT ED HANDOFF REVIEW    ED Nurse Handoff Report was reviewed by: Sheridan Johns RN on September 24, 2023 at 5:35 AM

## 2024-10-28 ENCOUNTER — HOSPITAL ENCOUNTER (EMERGENCY)
Facility: CLINIC | Age: 59
Discharge: HOME | End: 2024-10-28
Attending: EMERGENCY MEDICINE | Admitting: EMERGENCY MEDICINE
Payer: COMMERCIAL

## 2024-10-28 VITALS
DIASTOLIC BLOOD PRESSURE: 75 MMHG | SYSTOLIC BLOOD PRESSURE: 118 MMHG | HEIGHT: 64 IN | TEMPERATURE: 97.8 F | HEART RATE: 112 BPM | OXYGEN SATURATION: 99 % | BODY MASS INDEX: 20.66 KG/M2 | RESPIRATION RATE: 14 BRPM | WEIGHT: 121 LBS

## 2024-10-28 DIAGNOSIS — J10.1 INFLUENZA A: ICD-10-CM

## 2024-10-28 LAB
ALBUMIN SERPL BCG-MCNC: 4.3 G/DL (ref 3.5–5.2)
ALP SERPL-CCNC: 100 U/L (ref 40–150)
ALT SERPL W P-5'-P-CCNC: 27 U/L (ref 0–50)
ANION GAP SERPL CALCULATED.3IONS-SCNC: 13 MMOL/L (ref 7–15)
AST SERPL W P-5'-P-CCNC: 25 U/L (ref 0–45)
ATRIAL RATE - MUSE: 106 BPM
BASOPHILS # BLD AUTO: 0 10E3/UL (ref 0–0.2)
BASOPHILS NFR BLD AUTO: 0 %
BILIRUB SERPL-MCNC: 0.4 MG/DL
BUN SERPL-MCNC: 13.3 MG/DL (ref 6–20)
CALCIUM SERPL-MCNC: 9.4 MG/DL (ref 8.8–10.4)
CHLORIDE SERPL-SCNC: 96 MMOL/L (ref 98–107)
CREAT SERPL-MCNC: 0.62 MG/DL (ref 0.51–0.95)
DIASTOLIC BLOOD PRESSURE - MUSE: NORMAL MMHG
EGFRCR SERPLBLD CKD-EPI 2021: >90 ML/MIN/1.73M2
EOSINOPHIL # BLD AUTO: 0 10E3/UL (ref 0–0.7)
EOSINOPHIL NFR BLD AUTO: 0 %
ERYTHROCYTE [DISTWIDTH] IN BLOOD BY AUTOMATED COUNT: 12.2 % (ref 10–15)
FLUAV RNA SPEC QL NAA+PROBE: POSITIVE
FLUBV RNA RESP QL NAA+PROBE: NEGATIVE
GLUCOSE SERPL-MCNC: 315 MG/DL (ref 70–99)
HCO3 SERPL-SCNC: 27 MMOL/L (ref 22–29)
HCT VFR BLD AUTO: 39.1 % (ref 35–47)
HGB BLD-MCNC: 12.6 G/DL (ref 11.7–15.7)
IMM GRANULOCYTES # BLD: 0 10E3/UL
IMM GRANULOCYTES NFR BLD: 0 %
INTERPRETATION ECG - MUSE: NORMAL
LYMPHOCYTES # BLD AUTO: 0.5 10E3/UL (ref 0.8–5.3)
LYMPHOCYTES NFR BLD AUTO: 5 %
MCH RBC QN AUTO: 26.9 PG (ref 26.5–33)
MCHC RBC AUTO-ENTMCNC: 32.2 G/DL (ref 31.5–36.5)
MCV RBC AUTO: 84 FL (ref 78–100)
MONOCYTES # BLD AUTO: 0.8 10E3/UL (ref 0–1.3)
MONOCYTES NFR BLD AUTO: 8 %
NEUTROPHILS # BLD AUTO: 8.2 10E3/UL (ref 1.6–8.3)
NEUTROPHILS NFR BLD AUTO: 86 %
NRBC # BLD AUTO: 0 10E3/UL
NRBC BLD AUTO-RTO: 0 /100
P AXIS - MUSE: 74 DEGREES
PLATELET # BLD AUTO: 187 10E3/UL (ref 150–450)
POTASSIUM SERPL-SCNC: 4.6 MMOL/L (ref 3.4–5.3)
PR INTERVAL - MUSE: 140 MS
PROT SERPL-MCNC: 7.7 G/DL (ref 6.4–8.3)
QRS DURATION - MUSE: 88 MS
QT - MUSE: 336 MS
QTC - MUSE: 446 MS
R AXIS - MUSE: -28 DEGREES
RBC # BLD AUTO: 4.68 10E6/UL (ref 3.8–5.2)
RSV RNA SPEC NAA+PROBE: NEGATIVE
SARS-COV-2 RNA RESP QL NAA+PROBE: NEGATIVE
SODIUM SERPL-SCNC: 136 MMOL/L (ref 135–145)
SYSTOLIC BLOOD PRESSURE - MUSE: NORMAL MMHG
T AXIS - MUSE: 35 DEGREES
VENTRICULAR RATE- MUSE: 106 BPM
WBC # BLD AUTO: 9.5 10E3/UL (ref 4–11)

## 2024-10-28 PROCEDURE — 258N000003 HC RX IP 258 OP 636: Performed by: EMERGENCY MEDICINE

## 2024-10-28 PROCEDURE — 80053 COMPREHEN METABOLIC PANEL: CPT | Performed by: EMERGENCY MEDICINE

## 2024-10-28 PROCEDURE — 99284 EMERGENCY DEPT VISIT MOD MDM: CPT | Mod: 25

## 2024-10-28 PROCEDURE — 85025 COMPLETE CBC W/AUTO DIFF WBC: CPT | Performed by: EMERGENCY MEDICINE

## 2024-10-28 PROCEDURE — 96361 HYDRATE IV INFUSION ADD-ON: CPT

## 2024-10-28 PROCEDURE — 85004 AUTOMATED DIFF WBC COUNT: CPT | Performed by: EMERGENCY MEDICINE

## 2024-10-28 PROCEDURE — 96360 HYDRATION IV INFUSION INIT: CPT

## 2024-10-28 PROCEDURE — 93005 ELECTROCARDIOGRAM TRACING: CPT

## 2024-10-28 PROCEDURE — 87637 SARSCOV2&INF A&B&RSV AMP PRB: CPT | Performed by: EMERGENCY MEDICINE

## 2024-10-28 PROCEDURE — 36415 COLL VENOUS BLD VENIPUNCTURE: CPT | Performed by: EMERGENCY MEDICINE

## 2024-10-28 RX ORDER — ONDANSETRON 4 MG/1
4 TABLET, ORALLY DISINTEGRATING ORAL EVERY 6 HOURS PRN
Qty: 9 TABLET | Refills: 0 | Status: SHIPPED | OUTPATIENT
Start: 2024-10-28 | End: 2024-10-31

## 2024-10-28 RX ADMIN — SODIUM CHLORIDE 1000 ML: 9 INJECTION, SOLUTION INTRAVENOUS at 17:53

## 2024-10-28 ASSESSMENT — ACTIVITIES OF DAILY LIVING (ADL)
ADLS_ACUITY_SCORE: 0

## 2024-10-28 ASSESSMENT — COLUMBIA-SUICIDE SEVERITY RATING SCALE - C-SSRS
6. HAVE YOU EVER DONE ANYTHING, STARTED TO DO ANYTHING, OR PREPARED TO DO ANYTHING TO END YOUR LIFE?: NO
2. HAVE YOU ACTUALLY HAD ANY THOUGHTS OF KILLING YOURSELF IN THE PAST MONTH?: NO
1. IN THE PAST MONTH, HAVE YOU WISHED YOU WERE DEAD OR WISHED YOU COULD GO TO SLEEP AND NOT WAKE UP?: NO

## 2024-10-28 NOTE — ED PROVIDER NOTES
"  Emergency Department Note      History of Present Illness     Chief Complaint   Nausea, Vomiting, & Diarrhea      HPI   Kavya Drake is a 59 year old female who is a  with a history of type 2 diabetes mellitus and GERD presents for evaluation of nausea, vomiting and dizziness. Patient reports that she felt dizzy followed by episodes of vomiting since 5:30 am since morning while she was flying back to [place]. She believes her symptoms to be attributed from the salad she had at 1430 pm yesterday (10/27/2024). She endorses five episodes of vomiting since onset. She also reports chills. She denies room spinning dizziness, describing the feeling of faintness while standing and walking.   Denies fevers, cough, congestion, myalgias or diarrhea aside from a singular loose stool. She is on Trulicity for her DM.     Independent Historian   None    Review of External Notes   none    Past Medical History     Medical History and Problem List   Allergic rhinitis  Asthma  Diffuse goiter  GERD  Hyperlipidemia  Insomnia  Malabsorption syndrome  Liver steatosis  Type 2 diabetes mellitus    Medications   Albuterol inhaler  Atorvastatin  Antivert  Amaryl  Cyclobenzaprine  Crestor  Deltasone  Jardiance 10 mg  Janumet  mg  Ibuprofen  Lantus  Trulicity  Zofran    Surgical History   C section  Cholecystectomy  Colonoscopy  Endometrial ablation    Physical Exam     Patient Vitals for the past 24 hrs:   BP Temp Temp src Pulse Resp SpO2 Height Weight   10/28/24 2029 118/75 -- -- 112 14 99 % -- --   10/28/24 1640 126/75 -- -- 105 18 98 % -- --   10/28/24 1418 111/70 97.8  F (36.6  C) Temporal 109 16 99 % 1.626 m (5' 4\") 54.9 kg (121 lb)     Physical Exam  Nursing note and vitals reviewed.  HENT:   Mouth/Throat: Moist mucous membranes.   Eyes: EOMI, nonicteric sclera  Cardiovascular: mild tachycardia, regular rhythm, no murmurs, rubs, or gallops  Pulmonary/Chest: Effort normal and breath sounds normal. " No respiratory distress. No wheezes. No rales.   Abdominal: Soft. Nontender, nondistended, no guarding or rigidity.   Musculoskeletal: Normal range of motion.   Neurological: Alert. Moves all extremities spontaneously.   Skin: Skin is warm and dry. No rash noted.         Diagnostics     Lab Results   Labs Ordered and Resulted from Time of ED Arrival to Time of ED Departure   COMPREHENSIVE METABOLIC PANEL - Abnormal       Result Value    Sodium 136      Potassium 4.6      Carbon Dioxide (CO2) 27      Anion Gap 13      Urea Nitrogen 13.3      Creatinine 0.62      GFR Estimate >90      Calcium 9.4      Chloride 96 (*)     Glucose 315 (*)     Alkaline Phosphatase 100      AST 25      ALT 27      Protein Total 7.7      Albumin 4.3      Bilirubin Total 0.4     CBC WITH PLATELETS AND DIFFERENTIAL - Abnormal    WBC Count 9.5      RBC Count 4.68      Hemoglobin 12.6      Hematocrit 39.1      MCV 84      MCH 26.9      MCHC 32.2      RDW 12.2      Platelet Count 187      % Neutrophils 86      % Lymphocytes 5      % Monocytes 8      % Eosinophils 0      % Basophils 0      % Immature Granulocytes 0      NRBCs per 100 WBC 0      Absolute Neutrophils 8.2      Absolute Lymphocytes 0.5 (*)     Absolute Monocytes 0.8      Absolute Eosinophils 0.0      Absolute Basophils 0.0      Absolute Immature Granulocytes 0.0      Absolute NRBCs 0.0     INFLUENZA A/B, RSV, & SARS-COV2 PCR - Abnormal    Influenza A PCR Positive (*)     Influenza B PCR Negative      RSV PCR Negative      SARS CoV2 PCR Negative       Imaging   No orders to display     EKG   ECG results from 10/28/24   EKG 12-lead, tracing only     Value    Systolic Blood Pressure     Diastolic Blood Pressure     Ventricular Rate 106    Atrial Rate 106    NY Interval 140    QRS Duration 88        QTc 446    P Axis 74    R AXIS -28    T Axis 35    Interpretation ECG      Sinus tachycardia  Minimal voltage criteria for LVH, may be normal variant ( Ismael product )  Nonspecific T  wave abnormality    Confirmed by Dr. Emigdio MD       Independent Interpretation   None    ED Course      Medications Administered   Medications   sodium chloride 0.9% BOLUS 1,000 mL (0 mLs Intravenous Stopped 10/28/24 1947)     Discussion of Management   None    ED Course   ED Course as of 10/28/24 2213   Mon Oct 28, 2024   1732 I evaluated patient   2009 Rechecked     Additional Documentation  None    Medical Decision Making / Diagnosis     CMS Diagnoses: None    MIPS   None    MDM   Kavya Drake is a 59 year old female who presents with chief complaint nausea and vomiting.  Vital signs notable for mild tachycardia.  Her abdominal exam is overall benign.  Broad differential considered including viral gastroenteritis, food poisoning, bowel obstruction, vertigo, among many other etiologies.  Viral swab positive for influenza A.  Patient's with influenza concerned may have GI symptoms about 5 to 10% of the time.  Symptoms improved here after treatment with antiemetics and fluids.  We discussed possible Tamiflu treatment, however I advised against use given low proven efficacy, and most common side effects including GI illness.  I believe the tachycardia is explained by viral illness and dehydration and is unlikely to represent PE or other more emergent pathology.She is in stable condition at the time of discharge, red flags that should merit ED return were discussed as well as recommended follow-up instructions. All questions were answered and she is in agreement with the plan.      Disposition   The patient was discharged.     Diagnosis     ICD-10-CM    1. Influenza A  J10.1          Discharge Medications   Discharge Medication List as of 10/28/2024  8:25 PM        START taking these medications    Details   !! ondansetron (ZOFRAN ODT) 4 MG ODT tab Take 1 tablet (4 mg) by mouth every 6 hours as needed for nausea or vomiting., Disp-9 tablet, R-0, Local Print       !! - Potential duplicate medications  found. Please discuss with provider.        Scribe Disclosure:  I, TIA FRIEDMANSER, am serving as a scribe  at 8:18 PM on 10/28/2024 to document services personally performed by Maurilio Beal MD based on my observations and the provider's statements to me.    Scribe Disclosure:  I, Preston Benzsravanthi, am serving as a scribe at 5:22 PM on 10/28/2024 to document services personally performed by Maurilio Beal MD based on my observations and the provider's statements to me.        Maurilio Beal MD  11/01/24 1533

## 2024-10-28 NOTE — ED TRIAGE NOTES
Patient BIBA from hotel where patient is staying after flying in this morning. Patient is a . Patient c/o NVD since 0500, thinks maybe she ate a bad salad. EMS gave 18G to left AC, 500cc NS and 4mg zofran .

## 2024-10-28 NOTE — Clinical Note
Kavya Drake was seen and treated in our emergency department on 10/28/2024.    Pt tested positive for influenza A. Off work until symptoms improving for 24 hours including no vomiting, no fever, no severe coughing.      Sincerely,     [facility] Emergency Dept

## 2024-11-08 ENCOUNTER — HOSPITAL ENCOUNTER (EMERGENCY)
Facility: CLINIC | Age: 59
Discharge: HOME | End: 2024-11-08
Attending: EMERGENCY MEDICINE | Admitting: EMERGENCY MEDICINE
Payer: OTHER MISCELLANEOUS

## 2024-11-08 ENCOUNTER — APPOINTMENT (OUTPATIENT)
Dept: CT IMAGING | Facility: CLINIC | Age: 59
End: 2024-11-08
Attending: EMERGENCY MEDICINE
Payer: OTHER MISCELLANEOUS

## 2024-11-08 VITALS
HEART RATE: 91 BPM | RESPIRATION RATE: 16 BRPM | SYSTOLIC BLOOD PRESSURE: 136 MMHG | OXYGEN SATURATION: 100 % | DIASTOLIC BLOOD PRESSURE: 79 MMHG

## 2024-11-08 DIAGNOSIS — S16.1XXA CERVICAL STRAIN, INITIAL ENCOUNTER: ICD-10-CM

## 2024-11-08 DIAGNOSIS — S09.90XA CLOSED HEAD INJURY, INITIAL ENCOUNTER: ICD-10-CM

## 2024-11-08 PROCEDURE — 70450 CT HEAD/BRAIN W/O DYE: CPT

## 2024-11-08 PROCEDURE — 72125 CT NECK SPINE W/O DYE: CPT

## 2024-11-08 PROCEDURE — 99284 EMERGENCY DEPT VISIT MOD MDM: CPT | Mod: 25

## 2024-11-08 ASSESSMENT — ACTIVITIES OF DAILY LIVING (ADL)
ADLS_ACUITY_SCORE: 0
ADLS_ACUITY_SCORE: 0

## 2024-11-08 NOTE — LETTER
November 8, 2024      To Whom It May Concern:      Kavya Drake was seen in our Emergency Department today, 11/08/24.  I expect her condition to improve over the next 1 day.  She may return to work/school when improved.    Sincerely,        Jimmy Gaines MD
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
